# Patient Record
Sex: MALE | Race: WHITE | NOT HISPANIC OR LATINO | Employment: OTHER | URBAN - METROPOLITAN AREA
[De-identification: names, ages, dates, MRNs, and addresses within clinical notes are randomized per-mention and may not be internally consistent; named-entity substitution may affect disease eponyms.]

---

## 2022-06-15 ENCOUNTER — HOSPITAL ENCOUNTER (EMERGENCY)
Facility: MEDICAL CENTER | Age: 67
End: 2022-06-15
Attending: EMERGENCY MEDICINE
Payer: COMMERCIAL

## 2022-06-15 ENCOUNTER — APPOINTMENT (OUTPATIENT)
Dept: RADIOLOGY | Facility: MEDICAL CENTER | Age: 67
End: 2022-06-15
Attending: EMERGENCY MEDICINE
Payer: COMMERCIAL

## 2022-06-15 VITALS
WEIGHT: 179.23 LBS | TEMPERATURE: 98.4 F | DIASTOLIC BLOOD PRESSURE: 84 MMHG | SYSTOLIC BLOOD PRESSURE: 145 MMHG | RESPIRATION RATE: 16 BRPM | OXYGEN SATURATION: 97 % | HEART RATE: 75 BPM

## 2022-06-15 DIAGNOSIS — L08.9 FINGER INFECTION: ICD-10-CM

## 2022-06-15 PROCEDURE — A9270 NON-COVERED ITEM OR SERVICE: HCPCS | Performed by: EMERGENCY MEDICINE

## 2022-06-15 PROCEDURE — 73140 X-RAY EXAM OF FINGER(S): CPT | Mod: RT

## 2022-06-15 PROCEDURE — 99284 EMERGENCY DEPT VISIT MOD MDM: CPT

## 2022-06-15 PROCEDURE — 700102 HCHG RX REV CODE 250 W/ 637 OVERRIDE(OP): Performed by: EMERGENCY MEDICINE

## 2022-06-15 RX ORDER — CEPHALEXIN 500 MG/1
1000 CAPSULE ORAL ONCE
Status: COMPLETED | OUTPATIENT
Start: 2022-06-15 | End: 2022-06-15

## 2022-06-15 RX ORDER — CEPHALEXIN 500 MG/1
500 CAPSULE ORAL 4 TIMES DAILY
Refills: 0 | Status: CANCELLED | OUTPATIENT
Start: 2022-06-15

## 2022-06-15 RX ORDER — SULFAMETHOXAZOLE AND TRIMETHOPRIM 800; 160 MG/1; MG/1
1 TABLET ORAL 2 TIMES DAILY
Qty: 14 TABLET | Refills: 0 | Status: SHIPPED | OUTPATIENT
Start: 2022-06-15 | End: 2022-06-22

## 2022-06-15 RX ORDER — CEPHALEXIN 500 MG/1
1000 CAPSULE ORAL 3 TIMES DAILY
Qty: 42 CAPSULE | Refills: 0 | Status: SHIPPED | OUTPATIENT
Start: 2022-06-15 | End: 2022-06-22

## 2022-06-15 RX ORDER — SULFAMETHOXAZOLE AND TRIMETHOPRIM 800; 160 MG/1; MG/1
1 TABLET ORAL ONCE
Status: COMPLETED | OUTPATIENT
Start: 2022-06-15 | End: 2022-06-15

## 2022-06-15 RX ADMIN — SULFAMETHOXAZOLE AND TRIMETHOPRIM 1 TABLET: 800; 160 TABLET ORAL at 19:59

## 2022-06-15 RX ADMIN — CEPHALEXIN 1000 MG: 500 CAPSULE ORAL at 19:59

## 2022-06-15 ASSESSMENT — LIFESTYLE VARIABLES: DO YOU DRINK ALCOHOL: NO

## 2022-06-16 NOTE — DISCHARGE INSTRUCTIONS
You were seen in the Emergency Department for finger infection.    Xrays were completed without significant acute abnormalities.    Please use 1,000mg of tylenol or 600mg of ibuprofen every 6 hours as needed for pain.  Elevate is much as possible and take antibiotics as directed.    Please follow up either with primary care physician, hand specialist, or urgent care/ER in 2 days for wound recheck.    Return to the Emergency Department sooner with increasing pain, swelling, redness, or other concerns.

## 2022-06-16 NOTE — ED TRIAGE NOTES
"Chief Complaint   Patient presents with   • Wound Infection     Pt reports that \"36 hours ago\" he received a possible bug bite. Sent by an  in [place] for possible cellulitis. Right index finger.      Dressing in place. Wound not visualized in triage.  BP (!) 154/85   Pulse 78   Temp 37.1 °C (98.7 °F) (Temporal)   Resp 14   Wt 81.3 kg (179 lb 3.7 oz)   SpO2 96%   Pt informed of wait times. Educated on triage process.  Asked to return to triage RN for any new or worsening of symptoms. Thanked for patience.        "

## 2022-06-16 NOTE — ED PROVIDER NOTES
"ED Provider Note      CHIEF COMPLAINT  Chief Complaint   Patient presents with   • Wound Infection     Pt reports that \"36 hours ago\" he received a possible bug bite. Sent by an  in [place] for possible cellulitis. Right index finger.        HPI  Rickie Wilson is a 66 y.o. otherwise healthy male who presents to the emergency department with concern for infection to the finger.  The patient has been out camping and noticed yesterday morning early some pain and swelling to the right index finger.  He is unsure if he was bit by something however denies any significant knowledge of trauma.  Over the last 48 hours or so he has had increasing blistering and swelling to the tip of the finger.  He did attempt to arian the blister with some tweezers he had in his medical kit.  Pain and swelling has increased and he noticed some red streaking up the arm.  He reports some chills however no fevers and no other systemic symptoms.  He is able to extend the finger fully and has some trouble bending due to swelling however pain is controlled.  His last tetanus vaccination was last year.  He was seen at urgent care in [place] where they gave him a dose of IM ceftriaxone and sent him here for further evaluation.    REVIEW OF SYSTEMS  See HPI for further details.   Positive for right index finger pain and swelling, chills  Negative for fevers, numbness or weakness to the extremity    PAST MEDICAL HISTORY       SOCIAL HISTORY  Social History     Tobacco Use   • Smoking status: Never Smoker   • Smokeless tobacco: Not on file   Substance and Sexual Activity   • Alcohol use: Not Currently   • Drug use: Not Currently   • Sexual activity: Not on file       SURGICAL HISTORY  patient denies any surgical history    CURRENT MEDICATIONS  Home Medications     Reviewed by Katya Lares R.N. (Registered Nurse) on 06/15/22 at 1805  Med List Status: Partial   Medication Last Dose Status        Patient Winston Taking any Medications           "             ALLERGIES  Allergies   Allergen Reactions   • Erythromycin        PHYSICAL EXAM  VITAL SIGNS: BP (!) 154/85   Pulse 78   Temp 37.1 °C (98.7 °F) (Temporal)   Resp 14   Wt 81.3 kg (179 lb 3.7 oz)   SpO2 96%    Constitutional: Well-appearing middle-aged male.  Alert in no apparent distress.  HENT: Normocephalic, Atraumatic. Bilateral external ears normal. Nose normal. Moist mucous membranes.  Neck: Supple, full range of motion.  Eyes: Pupils are equal and reactive. Conjunctiva normal.   Skin: Warm, Dry. No rash.   Musculoskeletal: Atraumatic, no deformities noted.  Swelling and erythema noted distal to the PIP joint of the right index finger.  There is a blister that is almost circumferential to the tip of the finger with some scant purulent drainage.  No significant tenderness over the pulp of the finger.  Some limited flexion due to swelling however no significant pain with range of motion of the finger.  Neurologic: Alert and oriented. Moving all extremities spontaneously  Psychiatric: Affect normal, Mood normal. Appears appropriate and not intoxicated.               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  Unclear if possible insect  bite, no history of significant trauma.  He is afebrile with normal vital signs on arrival and no signs of systemic illness.  His exam shows somewhat significant swelling and erythema to the distal tip of the finger.  He does have blistering that is almost circumferential but seems superficial with some purulent drainage.  He is good range of motion of the finger without significant pain therefore doubt flexor tenosynovitis or deeper infection.  He does not have any significant tenderness over the pulp to suggest felon.  X-ray does not show underlying bony abnormality or foreign body.    8:00 PM - I discussed the case with Dr. Rosenbaum, orthopedic/hand specialist on-call and he has reviewed pictures.  He feels that this can likely be treated with oral antibiotics and close follow-up in 2 days.    8:03 PM - Upon reassessment, patient is resting comfortably with normal vital signs.  No new complaints at this time.  Discussed results with patient and/or family as well as importance of primary care/hand follow up in 2 days for wound recheck.  Patient understands plan of care and strict return precautions for new or changing symptoms.         IMPRESSION  (L08.9) Finger infection    Disposition: Discharge home, stable condition  Results, diagnoses, and treatment options were discussed with the patient and/or family. Patient verbalized understanding of plan of care and strict return precautions prior to discharge.    Patient referred to primary care provider for monitoring and treatment of blood pressure.      New Prescriptions    No medications on file         Electronically signed by: Trina Vega M.D., 6/15/2022 7:22 PM